# Patient Record
Sex: FEMALE | Race: ASIAN | NOT HISPANIC OR LATINO
[De-identification: names, ages, dates, MRNs, and addresses within clinical notes are randomized per-mention and may not be internally consistent; named-entity substitution may affect disease eponyms.]

---

## 2022-02-14 PROBLEM — Z00.00 ENCOUNTER FOR PREVENTIVE HEALTH EXAMINATION: Status: ACTIVE | Noted: 2022-02-14

## 2022-02-15 ENCOUNTER — APPOINTMENT (OUTPATIENT)
Dept: UROLOGY | Facility: CLINIC | Age: 22
End: 2022-02-15
Payer: COMMERCIAL

## 2022-02-15 ENCOUNTER — NON-APPOINTMENT (OUTPATIENT)
Age: 22
End: 2022-02-15

## 2022-02-15 VITALS
DIASTOLIC BLOOD PRESSURE: 72 MMHG | TEMPERATURE: 99.1 F | BODY MASS INDEX: 16.65 KG/M2 | HEART RATE: 79 BPM | HEIGHT: 66.14 IN | WEIGHT: 103.62 LBS | SYSTOLIC BLOOD PRESSURE: 104 MMHG

## 2022-02-15 DIAGNOSIS — R31.0 GROSS HEMATURIA: ICD-10-CM

## 2022-02-15 DIAGNOSIS — Z78.9 OTHER SPECIFIED HEALTH STATUS: ICD-10-CM

## 2022-02-15 PROCEDURE — 99204 OFFICE O/P NEW MOD 45 MIN: CPT

## 2022-02-15 NOTE — HISTORY OF PRESENT ILLNESS
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 PID; NORMAL kidneys . Images not able to reviewed\par NEVER sexually active\par Regular menses x 5 days; 30 day cycle; pain first day of menses\par Gross hematuria has occurs 5-6 days after period\par  [Urinary Incontinence] : no urinary incontinence [Urinary Retention] : no urinary retention [Urinary Urgency] : no urinary urgency [Urinary Frequency] : no urinary frequency [Nocturia] : no nocturia

## 2022-02-15 NOTE — HISTORY OF PRESENT ILLNESS
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 PID; NORMAL kidneys . Images not able to reviewed\par NEVER sexually active\par Regular menses x 5 days; 30 day cycle; pain first day of menses\par Gross hematuria has occurs 5-6 days after period\par  [Urinary Incontinence] : no urinary incontinence [Urinary Retention] : no urinary retention [Urinary Urgency] : no urinary urgency [Urinary Frequency] : no urinary frequency [Nocturia] : no nocturia

## 2022-02-15 NOTE — ASSESSMENT
[FreeTextEntry1] : 21 year old  at [employer] accompanied by room mate\par Patient has never been sexually active\par Presents with 2 days of gross hematuria ( by one month)\par At each event she had a negative urine culture.\par SHe underwent evaluation with ultrasound and CT \par no renal or bladder abnormalities\par hematuria does not seem to be temporally related to menses.\par DIscussed need for evaluation.\par Patient will obtain images for review.\par Discussed need for cystoscopy\par Plan:\par 1. urinalysis\par 2. culture\par 3. op cystoscopy\par

## 2022-02-15 NOTE — PHYSICAL EXAM
[Abdomen Soft] : soft [Abdomen Tenderness] : non-tender [] : no hepato-splenomegaly [Costovertebral Angle Tenderness] : no ~M costovertebral angle tenderness [Abdomen Mass (___ Cm)] : no abdominal mass palpated

## 2022-02-15 NOTE — ASSESSMENT
[FreeTextEntry1] : 21 year old  at [employer] accompanied by room mate\par Patient has never been sexually active\par Presents with 2 days of gross hematuria ( by one month)\par At each event she had a negative urine culture.\par SHe underwent evaluation with ultrasound and CT \par no renal or bladder abnormalities\par hematuria does not seem to be temporally related to menses.\par DIscussed need for evaluation.\par Patient will obtain images for review.\par Discussed need for cystoscopy\par Plan:\par 1. urinalysis\par 2. culture\par 3. op cystoscopy\par

## 2022-02-16 LAB
APPEARANCE: CLEAR
BACTERIA: NEGATIVE
BILIRUBIN URINE: NEGATIVE
BLOOD URINE: NEGATIVE
COLOR: COLORLESS
GLUCOSE QUALITATIVE U: NEGATIVE
HYALINE CASTS: 0 /LPF
KETONES URINE: NEGATIVE
LEUKOCYTE ESTERASE URINE: NEGATIVE
MICROSCOPIC-UA: NORMAL
NITRITE URINE: NEGATIVE
PH URINE: 7
PROTEIN URINE: NEGATIVE
RED BLOOD CELLS URINE: 2 /HPF
SPECIFIC GRAVITY URINE: 1.01
SQUAMOUS EPITHELIAL CELLS: 0 /HPF
UROBILINOGEN URINE: NORMAL
WHITE BLOOD CELLS URINE: 0 /HPF

## 2022-02-17 LAB
BACTERIA UR CULT: NORMAL
URINE CYTOLOGY: NORMAL

## 2022-02-23 ENCOUNTER — APPOINTMENT (OUTPATIENT)
Dept: UROLOGY | Facility: CLINIC | Age: 22
End: 2022-02-23
Payer: COMMERCIAL

## 2022-02-23 VITALS
OXYGEN SATURATION: 99 % | SYSTOLIC BLOOD PRESSURE: 104 MMHG | HEART RATE: 128 BPM | DIASTOLIC BLOOD PRESSURE: 69 MMHG | TEMPERATURE: 98.5 F

## 2022-02-23 LAB
BILIRUB UR QL STRIP: NORMAL
CLARITY UR: CLEAR
COLLECTION METHOD: NORMAL
GLUCOSE UR-MCNC: NORMAL
HCG UR QL: 0.2 EU/DL
HGB UR QL STRIP.AUTO: NORMAL
KETONES UR-MCNC: NORMAL
LEUKOCYTE ESTERASE UR QL STRIP: NORMAL
NITRITE UR QL STRIP: NORMAL
PH UR STRIP: 6
PROT UR STRIP-MCNC: NORMAL
SP GR UR STRIP: 1.01

## 2022-02-23 PROCEDURE — 52000 CYSTOURETHROSCOPY: CPT

## 2022-05-24 ENCOUNTER — APPOINTMENT (OUTPATIENT)
Dept: UROLOGY | Facility: CLINIC | Age: 22
End: 2022-05-24

## 2022-07-11 ENCOUNTER — APPOINTMENT (OUTPATIENT)
Dept: UROLOGY | Facility: CLINIC | Age: 22
End: 2022-07-11